# Patient Record
Sex: FEMALE | Race: OTHER
[De-identification: names, ages, dates, MRNs, and addresses within clinical notes are randomized per-mention and may not be internally consistent; named-entity substitution may affect disease eponyms.]

---

## 2019-09-17 ENCOUNTER — HOSPITAL ENCOUNTER (OUTPATIENT)
Dept: HOSPITAL 95 - PLD | Age: 69
End: 2019-09-17
Attending: PHYSICIAN ASSISTANT
Payer: MEDICARE

## 2019-09-17 DIAGNOSIS — D23.72: Primary | ICD-10-CM

## 2021-09-28 ENCOUNTER — HOSPITAL ENCOUNTER (OUTPATIENT)
Dept: HOSPITAL 95 - ORSCSDS | Age: 71
Discharge: HOME | End: 2021-09-28
Attending: OPHTHALMOLOGY
Payer: MEDICARE

## 2021-09-28 VITALS — HEIGHT: 66 IN | WEIGHT: 116.18 LBS | BODY MASS INDEX: 18.67 KG/M2

## 2021-09-28 DIAGNOSIS — H02.834: ICD-10-CM

## 2021-09-28 DIAGNOSIS — E11.9: ICD-10-CM

## 2021-09-28 DIAGNOSIS — H02.831: Primary | ICD-10-CM

## 2021-09-28 DIAGNOSIS — Z79.899: ICD-10-CM

## 2021-09-28 DIAGNOSIS — Z87.891: ICD-10-CM

## 2021-09-28 DIAGNOSIS — Z79.84: ICD-10-CM

## 2021-09-28 PROCEDURE — A9270 NON-COVERED ITEM OR SERVICE: HCPCS

## 2021-09-28 PROCEDURE — 080N0ZZ ALTERATION OF RIGHT UPPER EYELID, OPEN APPROACH: ICD-10-PCS | Performed by: OPHTHALMOLOGY

## 2021-09-28 PROCEDURE — 080P0ZZ ALTERATION OF LEFT UPPER EYELID, OPEN APPROACH: ICD-10-PCS | Performed by: OPHTHALMOLOGY

## 2022-02-02 ENCOUNTER — HOSPITAL ENCOUNTER (INPATIENT)
Dept: HOSPITAL 95 - ER | Age: 72
LOS: 1 days | Discharge: HOME | DRG: 247 | End: 2022-02-03
Attending: STUDENT IN AN ORGANIZED HEALTH CARE EDUCATION/TRAINING PROGRAM | Admitting: STUDENT IN AN ORGANIZED HEALTH CARE EDUCATION/TRAINING PROGRAM
Payer: MEDICARE

## 2022-02-02 VITALS — BODY MASS INDEX: 20.47 KG/M2 | HEIGHT: 64 IN | WEIGHT: 119.93 LBS

## 2022-02-02 DIAGNOSIS — Z79.4: ICD-10-CM

## 2022-02-02 DIAGNOSIS — Z88.1: ICD-10-CM

## 2022-02-02 DIAGNOSIS — E03.9: ICD-10-CM

## 2022-02-02 DIAGNOSIS — Z53.29: ICD-10-CM

## 2022-02-02 DIAGNOSIS — I10: ICD-10-CM

## 2022-02-02 DIAGNOSIS — E11.9: ICD-10-CM

## 2022-02-02 DIAGNOSIS — Z79.899: ICD-10-CM

## 2022-02-02 DIAGNOSIS — Z20.822: ICD-10-CM

## 2022-02-02 DIAGNOSIS — Z88.0: ICD-10-CM

## 2022-02-02 DIAGNOSIS — I21.4: Primary | ICD-10-CM

## 2022-02-02 LAB
ALBUMIN SERPL BCP-MCNC: 3.7 G/DL (ref 3.4–5)
ALBUMIN/GLOB SERPL: 1.1 {RATIO} (ref 0.8–1.8)
ALT SERPL W P-5'-P-CCNC: 17 U/L (ref 12–78)
ANION GAP SERPL CALCULATED.4IONS-SCNC: 8 MMOL/L (ref 6–16)
AST SERPL W P-5'-P-CCNC: 13 U/L (ref 12–37)
BASOPHILS # BLD AUTO: 0.07 K/MM3 (ref 0–0.23)
BASOPHILS NFR BLD AUTO: 1 % (ref 0–2)
BILIRUB SERPL-MCNC: 0.4 MG/DL (ref 0.1–1)
BUN SERPL-MCNC: 10 MG/DL (ref 8–24)
CALCIUM SERPL-MCNC: 9.2 MG/DL (ref 8.5–10.1)
CHLORIDE SERPL-SCNC: 109 MMOL/L (ref 98–108)
CHOLEST SERPL-MCNC: 214 MG/DL (ref 50–200)
CHOLEST/HDLC SERPL: 3.3 {RATIO}
CO2 SERPL-SCNC: 19 MMOL/L (ref 21–32)
CREAT SERPL-MCNC: 0.8 MG/DL (ref 0.4–1)
DEPRECATED RDW RBC AUTO: 40.5 FL (ref 35.1–46.3)
EOSINOPHIL # BLD AUTO: 0.22 K/MM3 (ref 0–0.68)
EOSINOPHIL NFR BLD AUTO: 4 % (ref 0–6)
ERYTHROCYTE [DISTWIDTH] IN BLOOD BY AUTOMATED COUNT: 12.1 % (ref 11.7–14.2)
FLUAV RNA SPEC QL NAA+PROBE: NEGATIVE
FLUBV RNA SPEC QL NAA+PROBE: NEGATIVE
GLOBULIN SER CALC-MCNC: 3.3 G/DL (ref 2.2–4)
GLUCOSE SERPL-MCNC: 180 MG/DL (ref 70–99)
HCT VFR BLD AUTO: 38.4 % (ref 33–51)
HDLC SERPL-MCNC: 65 MG/DL (ref 39–?)
HGB BLD-MCNC: 13.4 G/DL (ref 11.5–16)
IMM GRANULOCYTES # BLD AUTO: 0.01 K/MM3 (ref 0–0.1)
IMM GRANULOCYTES NFR BLD AUTO: 0 % (ref 0–1)
LDLC SERPL CALC-MCNC: 127 MG/DL (ref 0–110)
LDLC/HDLC SERPL: 2 {RATIO}
LYMPHOCYTES # BLD AUTO: 1.37 K/MM3 (ref 0.84–5.2)
LYMPHOCYTES NFR BLD AUTO: 27 % (ref 21–46)
MCHC RBC AUTO-ENTMCNC: 34.9 G/DL (ref 31.5–36.5)
MCV RBC AUTO: 91 FL (ref 80–100)
MONOCYTES # BLD AUTO: 0.41 K/MM3 (ref 0.16–1.47)
MONOCYTES NFR BLD AUTO: 8 % (ref 4–13)
NEUTROPHILS # BLD AUTO: 3.05 K/MM3 (ref 1.96–9.15)
NEUTROPHILS NFR BLD AUTO: 59 % (ref 41–73)
NRBC # BLD AUTO: 0 K/MM3 (ref 0–0.02)
NRBC BLD AUTO-RTO: 0 /100 WBC (ref 0–0.2)
PLATELET # BLD AUTO: 314 K/MM3 (ref 150–400)
POTASSIUM SERPL-SCNC: 3.8 MMOL/L (ref 3.5–5.5)
PROT SERPL-MCNC: 7 G/DL (ref 6.4–8.2)
PROTHROMBIN TIME: 10.3 SEC (ref 9.7–11.5)
RSV RNA SPEC QL NAA+PROBE: NEGATIVE
SARS-COV-2 RNA RESP QL NAA+PROBE: NEGATIVE
SODIUM SERPL-SCNC: 136 MMOL/L (ref 136–145)
TRIGL SERPL-MCNC: 109 MG/DL (ref 30–160)
VLDLC SERPL CALC-MCNC: 21 MG/DL (ref 6–32)

## 2022-02-02 PROCEDURE — C9600 PERC DRUG-EL COR STENT SING: HCPCS

## 2022-02-02 PROCEDURE — 4A033BC MEASUREMENT OF ARTERIAL PRESSURE, CORONARY, PERCUTANEOUS APPROACH: ICD-10-PCS | Performed by: INTERNAL MEDICINE

## 2022-02-02 PROCEDURE — 027135Z DILATION OF CORONARY ARTERY, TWO ARTERIES WITH TWO DRUG-ELUTING INTRALUMINAL DEVICES, PERCUTANEOUS APPROACH: ICD-10-PCS | Performed by: INTERNAL MEDICINE

## 2022-02-02 PROCEDURE — C1725 CATH, TRANSLUMIN NON-LASER: HCPCS

## 2022-02-02 PROCEDURE — 4A023N7 MEASUREMENT OF CARDIAC SAMPLING AND PRESSURE, LEFT HEART, PERCUTANEOUS APPROACH: ICD-10-PCS | Performed by: INTERNAL MEDICINE

## 2022-02-02 PROCEDURE — A9270 NON-COVERED ITEM OR SERVICE: HCPCS

## 2022-02-02 PROCEDURE — C1887 CATHETER, GUIDING: HCPCS

## 2022-02-02 PROCEDURE — C1769 GUIDE WIRE: HCPCS

## 2022-02-02 PROCEDURE — C1894 INTRO/SHEATH, NON-LASER: HCPCS

## 2022-02-02 PROCEDURE — B2111ZZ FLUOROSCOPY OF MULTIPLE CORONARY ARTERIES USING LOW OSMOLAR CONTRAST: ICD-10-PCS | Performed by: INTERNAL MEDICINE

## 2022-02-02 PROCEDURE — C1874 STENT, COATED/COV W/DEL SYS: HCPCS

## 2022-02-03 LAB
ALBUMIN SERPL BCP-MCNC: 3.1 G/DL (ref 3.4–5)
ANION GAP SERPL CALCULATED.4IONS-SCNC: 5 MMOL/L (ref 6–16)
BASOPHILS # BLD AUTO: 0.05 K/MM3 (ref 0–0.23)
BASOPHILS NFR BLD AUTO: 1 % (ref 0–2)
BUN SERPL-MCNC: 9 MG/DL (ref 8–24)
CALCIUM SERPL-MCNC: 8.5 MG/DL (ref 8.5–10.1)
CHLORIDE SERPL-SCNC: 114 MMOL/L (ref 98–108)
CO2 SERPL-SCNC: 22 MMOL/L (ref 21–32)
CREAT SERPL-MCNC: 0.77 MG/DL (ref 0.4–1)
DEPRECATED RDW RBC AUTO: 41.3 FL (ref 35.1–46.3)
EOSINOPHIL # BLD AUTO: 0.22 K/MM3 (ref 0–0.68)
EOSINOPHIL NFR BLD AUTO: 4 % (ref 0–6)
ERYTHROCYTE [DISTWIDTH] IN BLOOD BY AUTOMATED COUNT: 12.1 % (ref 11.7–14.2)
GLUCOSE SERPL-MCNC: 131 MG/DL (ref 70–99)
HCT VFR BLD AUTO: 33.8 % (ref 33–51)
HGB BLD-MCNC: 11.7 G/DL (ref 11.5–16)
IMM GRANULOCYTES # BLD AUTO: 0.02 K/MM3 (ref 0–0.1)
IMM GRANULOCYTES NFR BLD AUTO: 0 % (ref 0–1)
LYMPHOCYTES # BLD AUTO: 1.13 K/MM3 (ref 0.84–5.2)
LYMPHOCYTES NFR BLD AUTO: 19 % (ref 21–46)
MCHC RBC AUTO-ENTMCNC: 34.6 G/DL (ref 31.5–36.5)
MCV RBC AUTO: 92 FL (ref 80–100)
MONOCYTES # BLD AUTO: 0.53 K/MM3 (ref 0.16–1.47)
MONOCYTES NFR BLD AUTO: 9 % (ref 4–13)
NEUTROPHILS # BLD AUTO: 4.16 K/MM3 (ref 1.96–9.15)
NEUTROPHILS NFR BLD AUTO: 68 % (ref 41–73)
NRBC # BLD AUTO: 0 K/MM3 (ref 0–0.02)
NRBC BLD AUTO-RTO: 0 /100 WBC (ref 0–0.2)
PHOSPHATE SERPL-MCNC: 3.6 MG/DL (ref 2.5–4.9)
PLATELET # BLD AUTO: 250 K/MM3 (ref 150–400)
POTASSIUM SERPL-SCNC: 3.7 MMOL/L (ref 3.5–5.5)
SODIUM SERPL-SCNC: 141 MMOL/L (ref 136–145)

## 2022-02-03 NOTE — NUR
SHIFT SUMMARY
ASSUMED CARE OF PT AT 1900. PT IS A/OX4. HEART SOUNDS REGULAR, TELE SHOWED
SINUS. LUNG SOUNDS CLEAR. PT WAS SBA TO BATHROOM. ANGIO SITE ON R WRIST. PT
DENIES ANY PAIN. NO SWELLING NOTED.
 
CALL LIGHT IN REACH, BED IN LOWEST POSITION.

## 2022-02-03 NOTE — NUR
Per Dr. BUD Montez discharge appropriate. Patient does not oppose discharge.
Patient discharged home to residence.
Date of discharge: 02/03/2022
Date of admission: 02/02/2021
Provisional diagnosis at time of admission:  NSTEMI
Final Diagnosis at time of discharge:  bacterial NSTEMI
Location:  Patient is discharged home to residence: [address] [place] [address]
Transportation provided by:  [facility]/private vehicle
DME Ordered: None
Follow-ups needed:  [facility] will contact patient to schedule hospital
discharge follow-up. Informed patient [facility] will contact patient to schedule
an appointment.   Patient needs follow up with cardiology Dr. Cortez Caldera-office will contact patient to schedule.
Confirmed numbers:
Patient: [phone]
Provider/PCP: YUDITH Isaacs   When: WITHIN 1 WEEK if needed
Specialty: Cardiology    When: Office will contact patient to schedule
 
Comment:  No barriers to discharge.

## 2022-02-03 NOTE — NUR
Patient is sitting up in bed and alert. Pt talks about her heart attack and
the surgery that followed. She voices appreciation for the hospital staff and
the wonderful care and expertise in which she was handled. Patient then
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
[place], and her 4 grown children and thaeir families. Pt shows no signs of
spiritual distress but is easily encouraged by conversations centered around
rafi. I reinforce helpful attitudes and practices and provide spiritual
dialogue and prayer. Patient responds well and displays evidence of an
elevated mood.

## 2022-02-03 NOTE — NUR
DISCHARGE TEACHING REVIEWED WITH PT, VERBALIZES UNDERSTANDING AND HAS NO
FURTHER QUESTIONS OR CONCERNS AT THIS TIME. IV REMOVED, CATHETER INTACT. PT
VERBALIZES UNDERSTANDING OF DISCHARGE MEDICATIONS, FOLLOW UP APPOINTMENTS AND
RADIAL SITE CARE. ALL BELONGINGS SENT WITH PT IN THE CARE OF HER DAUGHTER.